# Patient Record
Sex: FEMALE | Race: WHITE | NOT HISPANIC OR LATINO | Employment: FULL TIME | URBAN - METROPOLITAN AREA
[De-identification: names, ages, dates, MRNs, and addresses within clinical notes are randomized per-mention and may not be internally consistent; named-entity substitution may affect disease eponyms.]

---

## 2020-01-01 ENCOUNTER — OFFICE VISIT (OUTPATIENT)
Dept: URGENT CARE | Facility: CLINIC | Age: 65
End: 2020-01-01
Payer: COMMERCIAL

## 2020-01-01 VITALS
WEIGHT: 111 LBS | HEIGHT: 65 IN | OXYGEN SATURATION: 99 % | RESPIRATION RATE: 19 BRPM | TEMPERATURE: 100 F | DIASTOLIC BLOOD PRESSURE: 97 MMHG | HEART RATE: 90 BPM | BODY MASS INDEX: 18.49 KG/M2 | SYSTOLIC BLOOD PRESSURE: 152 MMHG

## 2020-01-01 DIAGNOSIS — M77.9 TENDONITIS: ICD-10-CM

## 2020-01-01 DIAGNOSIS — M25.511 ACUTE PAIN OF RIGHT SHOULDER: Primary | ICD-10-CM

## 2020-01-01 PROCEDURE — 96372 PR INJECTION,THERAP/PROPH/DIAG2ST, IM OR SUBCUT: ICD-10-PCS | Mod: S$GLB,,, | Performed by: EMERGENCY MEDICINE

## 2020-01-01 PROCEDURE — 73030 X-RAY EXAM OF SHOULDER: CPT | Mod: FY,RT,S$GLB, | Performed by: RADIOLOGY

## 2020-01-01 PROCEDURE — 99203 PR OFFICE/OUTPT VISIT, NEW, LEVL III, 30-44 MIN: ICD-10-PCS | Mod: 25,S$GLB,, | Performed by: FAMILY MEDICINE

## 2020-01-01 PROCEDURE — 73030 XR SHOULDER COMPLETE 2 OR MORE VIEWS RIGHT: ICD-10-PCS | Mod: FY,RT,S$GLB, | Performed by: RADIOLOGY

## 2020-01-01 PROCEDURE — 99203 OFFICE O/P NEW LOW 30 MIN: CPT | Mod: 25,S$GLB,, | Performed by: FAMILY MEDICINE

## 2020-01-01 PROCEDURE — 96372 THER/PROPH/DIAG INJ SC/IM: CPT | Mod: S$GLB,,, | Performed by: EMERGENCY MEDICINE

## 2020-01-01 RX ORDER — METHYLPREDNISOLONE 4 MG/1
TABLET ORAL
Qty: 1 PACKAGE | Refills: 0 | Status: SHIPPED | OUTPATIENT
Start: 2020-01-01

## 2020-01-01 RX ORDER — AMLODIPINE BESYLATE 5 MG/1
TABLET ORAL
COMMUNITY
Start: 2019-12-17

## 2020-01-01 RX ORDER — KETOROLAC TROMETHAMINE 30 MG/ML
30 INJECTION, SOLUTION INTRAMUSCULAR; INTRAVENOUS
Status: COMPLETED | OUTPATIENT
Start: 2020-01-01 | End: 2020-01-01

## 2020-01-01 RX ORDER — ALPRAZOLAM 0.25 MG/1
TABLET ORAL
COMMUNITY
Start: 2019-12-17

## 2020-01-01 RX ADMIN — KETOROLAC TROMETHAMINE 30 MG: 30 INJECTION, SOLUTION INTRAMUSCULAR; INTRAVENOUS at 03:01

## 2020-01-01 NOTE — PATIENT INSTRUCTIONS
PLEASE READ YOUR DISCHARGE INSTRUCTIONS ENTIRELY AS IT CONTAINS IMPORTANT INFORMATION.    Tylenol and ibuprofen for pain  DO NOT TAKE ANY MORE NAPROXEN OR IBUPROFEN FOR 24 HOURS AS YOU RECEIVED A LARGE DOSE OF IT VIA INJECTION    If you take the medrol pack , take to completion and monitor blood pressure    Rest, ice, compress, and elevate at home  Sling as needed    Avoid prolonged use of the affected area until better.     Please see orthopedics if you develop new or worsening symptoms.     Please arrange follow up with your primary medical clinic as soon as possible. You must understand that you've received an Urgent Care treatment only and that you may be released before all of your medical problems are known or treated. You, the patient, will arrange for follow up as instructed. If your symptoms worsen or fail to improve you should go to the Emergency Room.    Arthralgia    Arthralgia is the term for pain in or around the joint. It is a symptom, not a disease. This pain may involve one or more joints. In some cases, the pain moves from joint to joint.  There are many causes for joint pain. These include:  · Injury  · Osteoarthritis (wearing out of the joint surface)  · Gout (inflammation of the joint due to crystals in the joint fluid)  · Infection inside the joint    · Bursitis (inflammation of the fluid-filled sacs around the joint)  · Autoimmune disorders such as rheumatoid arthritis or lupus  · Tendonitis (inflammation of chords that attach muscle to bone)  Home care  · Rest the involved joint(s) until your symptoms improve.   · You may be prescribed pain medicine. If none is prescribed, you may use acetaminophen or ibuprofen to control pain and inflammation.  Follow-up care  Follow up with your healthcare provider or as advised.  When to seek medical advice  Contact your healthcare provider right away if any of the following occurs:  · Pain, swelling, or redness of joint increases  · Pain worsens or recurs  after a period of improvement  · Pain moves to other joints  · You cannot bear weight on the affected joint   · You cannot move the affected joint  · Joint appears deformed  · New rash appears  · Fever of 100.4ºF (38ºC) or higher, or as directed by your healthcare provider  Date Last Reviewed: 3/1/2017  © 7221-1460 [employer]. [address] [place] [address], [place] [address]. All rights reserved. This information is not intended as a substitute for professional medical care. Always follow your healthcare professional's instructions.

## 2020-01-01 NOTE — PROGRESS NOTES
"Subjective:       Patient ID: Pepper Campos is a 64 y.o. female.    Vitals:  height is 5' 4.5" (1.638 m) and weight is 50.3 kg (111 lb). Her oral temperature is 99.5 °F (37.5 °C). Her blood pressure is 152/97 (abnormal) and her pulse is 90. Her respiration is 19 and oxygen saturation is 99%.     Chief Complaint: Shoulder Pain (right)    Right shoulder pain starting 4 days ago.  No injury.  States arm is more painful to move feels a little swollen.  No rash.  She has been take Tylenol and ibuprofen.  No injury to the shoulder in the past.  She did not lift anything heavy she can't specifically remember but it has been around her grandchildren lifting them recently.  Hurts to sleep on the right side    Shoulder Pain    The pain is present in the right shoulder. This is a new problem. The current episode started in the past 7 days (sunday). There has been no history of extremity trauma. The problem occurs constantly. The problem has been gradually worsening. The quality of the pain is described as aching. The pain is at a severity of 10/10. The pain is severe. Pertinent negatives include no fever, headaches, inability to bear weight, itching, joint locking, joint swelling, limited range of motion, numbness, stiffness, tingling or visual symptoms. The symptoms are aggravated by activity. She has tried acetaminophen and NSAIDS for the symptoms. The treatment provided mild relief. Family history does not include arthritis. There is no history of diabetes, Injuries to Extremity or migraines.       Constitution: Negative for chills, fatigue and fever.   HENT: Negative for congestion and sore throat.    Neck: Negative for painful lymph nodes.   Cardiovascular: Negative for chest pain and leg swelling.   Eyes: Negative for double vision and blurred vision.   Respiratory: Negative for cough and shortness of breath.    Gastrointestinal: Negative for nausea, vomiting and diarrhea.   Genitourinary: Negative for dysuria, frequency, " urgency and history of kidney stones.   Musculoskeletal: Positive for joint pain. Negative for joint swelling, abnormal ROM of joint, muscle cramps and muscle ache.   Skin: Negative for color change, pale, rash and bruising.   Allergic/Immunologic: Negative for seasonal allergies.   Neurological: Negative for dizziness, history of vertigo, light-headedness, passing out, headaches and numbness.   Hematologic/Lymphatic: Negative for swollen lymph nodes.   Psychiatric/Behavioral: Negative for nervous/anxious, sleep disturbance and depression. The patient is not nervous/anxious.        Objective:      Physical Exam   Constitutional: She is oriented to person, place, and time. Vital signs are normal. She appears well-developed and well-nourished. She is active and cooperative. No distress.   HENT:   Head: Normocephalic and atraumatic.   Nose: Nose normal.   Mouth/Throat: Oropharynx is clear and moist and mucous membranes are normal.   Eyes: Conjunctivae and lids are normal.   Neck: Trachea normal, normal range of motion, full passive range of motion without pain and phonation normal. Neck supple.   Cardiovascular: Normal rate, regular rhythm, normal heart sounds, intact distal pulses and normal pulses.   Pulmonary/Chest: Effort normal and breath sounds normal.   Abdominal: Soft. Normal appearance and bowel sounds are normal. She exhibits no abdominal bruit, no pulsatile midline mass and no mass.   Musculoskeletal: She exhibits no edema or deformity.        Right shoulder: She exhibits decreased range of motion (Abduction to only about 45 degrees passive and active), tenderness and bony tenderness. She exhibits no swelling, no effusion, no crepitus, no deformity and no laceration.   No rash.   Cap refill 2 seconds, radial pulse 2+, sensation intact     Neurological: She is alert and oriented to person, place, and time. She has normal strength and normal reflexes. No sensory deficit.   Skin: Skin is warm, dry, intact and  not diaphoretic.   Psychiatric: She has a normal mood and affect. Her speech is normal and behavior is normal. Judgment and thought content normal. Cognition and memory are normal.   Nursing note and vitals reviewed.      Xr Shoulder Complete 2 Or More Views Right    Result Date: 1/1/2020  EXAMINATION: XR SHOULDER COMPLETE 2 OR MORE VIEWS RIGHT CLINICAL HISTORY: Pain in right shoulder TECHNIQUE: Two or three views of the right shoulder were performed. COMPARISON: None FINDINGS: Two views right shoulder. The glenohumeral joint is intact.  The acromioclavicular joint is intact.  Amorphous calcification along the superolateral aspect of the humeral head suggests that of calcific tendinosis.     1. 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 Electronically signed by: Kilo Gagnon MD Date:    01/01/2020 Time:    15:36    Assessment:       1. Acute pain of right shoulder    2. Tendonitis        Plan:         Acute pain of right shoulder  -     ketorolac injection 30 mg  -     XR SHOULDER COMPLETE 2 OR MORE VIEWS RIGHT; Future; Expected date: 01/01/2020  -     SLING FOR HOME USE  -     methylPREDNISolone (MEDROL DOSEPACK) 4 mg tablet; use as directed  Dispense: 1 Package; Refill: 0    Tendonitis  Comments:  calcified    medrol pack printed, take if pain not improving monitor bp  nsaids at home she has ortho she can f/u with, sling prn    Patient Instructions   PLEASE READ YOUR DISCHARGE INSTRUCTIONS ENTIRELY AS IT CONTAINS IMPORTANT INFORMATION.    Tylenol and ibuprofen for pain  DO NOT TAKE ANY MORE NAPROXEN OR IBUPROFEN FOR 24 HOURS AS YOU RECEIVED A LARGE DOSE OF IT VIA INJECTION    If you take the medrol pack , take to completion and monitor blood pressure    Rest, ice, compress, and elevate at home  Sling as needed    Avoid prolonged use of the affected area until better.     Please see orthopedics if you develop new or worsening symptoms.     Please  arrange follow up with your primary medical clinic as soon as possible. You must understand that you've received an Urgent Care treatment only and that you may be released before all of your medical problems are known or treated. You, the patient, will arrange for follow up as instructed. If your symptoms worsen or fail to improve you should go to the Emergency Room.    Arthralgia    Arthralgia is the term for pain in or around the joint. It is a symptom, not a disease. This pain may involve one or more joints. In some cases, the pain moves from joint to joint.  There are many causes for joint pain. These include:  · Injury  · Osteoarthritis (wearing out of the joint surface)  · Gout (inflammation of the joint due to crystals in the joint fluid)  · Infection inside the joint    · Bursitis (inflammation of the fluid-filled sacs around the joint)  · Autoimmune disorders such as rheumatoid arthritis or lupus  · Tendonitis (inflammation of chords that attach muscle to bone)  Home care  · Rest the involved joint(s) until your symptoms improve.   · You may be prescribed pain medicine. If none is prescribed, you may use acetaminophen or ibuprofen to control pain and inflammation.  Follow-up care  Follow up with your healthcare provider or as advised.  When to seek medical advice  Contact your healthcare provider right away if any of the following occurs:  · Pain, swelling, or redness of joint increases  · Pain worsens or recurs after a period of improvement  · Pain moves to other joints  · You cannot bear weight on the affected joint   · You cannot move the affected joint  · Joint appears deformed  · New rash appears  · Fever of 100.4ºF (38ºC) or higher, or as directed by your healthcare provider  Date Last Reviewed: 3/1/2017  © 8897-8044 [employer]. [address] [place] [address]. All rights reserved. This information is not intended as a substitute for professional medical care. Always follow  your healthcare professional's instructions.

## 2020-02-05 ENCOUNTER — OFFICE VISIT (OUTPATIENT)
Dept: URGENT CARE | Facility: CLINIC | Age: 65
End: 2020-02-05
Payer: COMMERCIAL

## 2020-02-05 VITALS
WEIGHT: 111 LBS | DIASTOLIC BLOOD PRESSURE: 78 MMHG | TEMPERATURE: 99 F | HEART RATE: 103 BPM | RESPIRATION RATE: 16 BRPM | BODY MASS INDEX: 18.49 KG/M2 | SYSTOLIC BLOOD PRESSURE: 125 MMHG | HEIGHT: 65 IN | OXYGEN SATURATION: 96 %

## 2020-02-05 DIAGNOSIS — J10.1 INFLUENZA A: Primary | ICD-10-CM

## 2020-02-05 LAB
CTP QC/QA: YES
FLUAV AG NPH QL: POSITIVE
FLUBV AG NPH QL: NEGATIVE

## 2020-02-05 PROCEDURE — 99213 PR OFFICE/OUTPT VISIT, EST, LEVL III, 20-29 MIN: ICD-10-PCS | Mod: 25,S$GLB,, | Performed by: NURSE PRACTITIONER

## 2020-02-05 PROCEDURE — 87804 INFLUENZA ASSAY W/OPTIC: CPT | Mod: QW,S$GLB,, | Performed by: NURSE PRACTITIONER

## 2020-02-05 PROCEDURE — 99213 OFFICE O/P EST LOW 20 MIN: CPT | Mod: 25,S$GLB,, | Performed by: NURSE PRACTITIONER

## 2020-02-05 PROCEDURE — 87804 POCT INFLUENZA A/B: ICD-10-PCS | Mod: 59,QW,S$GLB, | Performed by: NURSE PRACTITIONER

## 2020-02-05 RX ORDER — FLUTICASONE PROPIONATE 50 MCG
2 SPRAY, SUSPENSION (ML) NASAL DAILY
Qty: 16 ML | Refills: 0 | Status: SHIPPED | OUTPATIENT
Start: 2020-02-05

## 2020-02-05 RX ORDER — LEVOCETIRIZINE DIHYDROCHLORIDE 5 MG/1
5 TABLET, FILM COATED ORAL DAILY
Qty: 30 TABLET | Refills: 0 | Status: SHIPPED | OUTPATIENT
Start: 2020-02-05 | End: 2021-02-04

## 2020-02-05 RX ORDER — GUAIFENESIN 600 MG/1
600 TABLET, EXTENDED RELEASE ORAL 2 TIMES DAILY
Qty: 60 TABLET | Refills: 0 | Status: SHIPPED | OUTPATIENT
Start: 2020-02-05 | End: 2021-02-04

## 2020-02-05 RX ORDER — OSELTAMIVIR PHOSPHATE 75 MG/1
75 CAPSULE ORAL 2 TIMES DAILY
Qty: 10 CAPSULE | Refills: 0 | Status: SHIPPED | OUTPATIENT
Start: 2020-02-05 | End: 2020-02-10

## 2020-02-05 RX ORDER — PROMETHAZINE HYDROCHLORIDE AND DEXTROMETHORPHAN HYDROBROMIDE 6.25; 15 MG/5ML; MG/5ML
5 SYRUP ORAL
Qty: 180 ML | Refills: 0 | Status: SHIPPED | OUTPATIENT
Start: 2020-02-05 | End: 2020-02-10

## 2020-02-05 NOTE — PATIENT INSTRUCTIONS
Please follow up with your Primary care provider within 2-5 days if your signs and symptoms have not resolved or worsen.     If your condition worsens or fails to improve we recommend that you receive another evaluation at the emergency room immediately or contact your primary medical clinic to discuss your concerns.   You must understand that you have received an Urgent Care treatment only and that you may be released before all of your medical problems are known or treated. You, the patient, will arrange for follow up care as instructed.     RED FLAGS/WARNING SYMPTOMS DISCUSSED WITH PATIENT THAT WOULD WARRANT EMERGENT MEDICAL ATTENTION. PATIENT VERBALIZED UNDERSTANDING.       Influenza (Adult)    Influenza is also called the flu. It is a viral illness that affects the air passages of your lungs. It is different from the common cold. The flu can easily be passed from one to person to another. It may be spread through the air by coughing and sneezing. Or it can be spread by touching the sick person and then touching your own eyes, nose, or mouth.  The flu starts 1 to 3 days after you are exposed to the flu virus. It may last for 1 to 2 weeks but many people feel tired or fatigued for many weeks afterward. You usually dont need to take antibiotics unless you have a complication. This might be an ear or sinus infection or pneumonia.  Symptoms of the flu may be mild or severe. They can include extreme tiredness (wanting to stay in bed all day), chills, fevers, muscle aches, soreness with eye movement, headache, and a dry, hacking cough.  Home care  Follow these guidelines when caring for yourself at home:  · Avoid being around cigarette smoke, whether yours or other peoples.  · Acetaminophen or ibuprofen will help ease your fever, muscle aches, and headache. Dont give aspirin to anyone younger than 18 who has the flu. Aspirin can harm the liver.  · Nausea and loss of appetite are common with the flu. Eat light  meals. Drink 6 to 8 glasses of liquids every day. Good choices are water, sport drinks, soft drinks without caffeine, juices, tea, and soup. Extra fluids will also help loosen secretions in your nose and lungs.  · Over-the-counter cold medicines will not make the flu go away faster. But the medicines may help with coughing, sore throat, and congestion in your nose and sinuses. Dont use a decongestant if you have high blood pressure.  · Stay home until your fever has been gone for at least 24 hours without using medicine to reduce fever.  Follow-up care  Follow up with your healthcare provider, or as advised, if you are not getting better over the next week.  If you are age 65 or older, talk with your provider about getting a pneumococcal vaccine every 5 years. You should also get this vaccine if you have chronic asthma or COPD. All adults should get a flu vaccine every fall. Ask your provider about this.  When to seek medical advice  Call your healthcare provider right away if any of these occur:  · Cough with lots of colored mucus (sputum) or blood in your mucus  · Chest pain, shortness of breath, wheezing, or trouble breathing  · Severe headache, or face, neck, or ear pain  · New rash with fever  · Fever of 100.4°F (38°C) or higher, or as directed by your healthcare provider  · Confusion, behavior change, or seizure  · Severe weakness or dizziness  · You get a new fever or cough after getting better for a few days  Date Last Reviewed: 1/1/2017  © 9909-2486 [employer]. [address] [place] [address], [place] [address]. All rights reserved. This information is not intended as a substitute for professional medical care. Always follow your healthcare professional's instructions.

## 2020-02-05 NOTE — PROGRESS NOTES
"Subjective:       Patient ID: Pepper Campos is a 64 y.o. female.    Vitals:  height is 5' 4.5" (1.638 m) and weight is 50.3 kg (111 lb). Her temperature is 99.4 °F (37.4 °C). Her blood pressure is 125/78 and her pulse is 103. Her respiration is 16 and oxygen saturation is 96%.     Chief Complaint: Cough    Pt states cough with chills, body aches, and headache x 3 days. Pt states temp of 101 and nausea this am. Pt took tylenol for fever. Pt just got back from [place].    Cough   This is a new problem. The current episode started in the past 7 days. The problem has been gradually worsening. The cough is non-productive. Associated symptoms include chills, headaches and myalgias. Pertinent negatives include no chest pain, fever, rash, sore throat or shortness of breath. Nothing aggravates the symptoms. Treatments tried: tylenol. The treatment provided moderate relief. Her past medical history is significant for bronchitis.       Constitution: Positive for chills. Negative for fatigue and fever.   HENT: Positive for congestion. Negative for sore throat.    Neck: Negative for painful lymph nodes.   Cardiovascular: Negative for chest pain and leg swelling.   Eyes: Negative for double vision and blurred vision.   Respiratory: Positive for cough. Negative for shortness of breath.    Gastrointestinal: Positive for nausea. Negative for vomiting and diarrhea.   Genitourinary: Negative for dysuria, frequency, urgency and history of kidney stones.   Musculoskeletal: Positive for muscle ache. Negative for joint pain, joint swelling and muscle cramps.   Skin: Negative for color change, pale, rash and bruising.   Allergic/Immunologic: Negative for seasonal allergies.   Neurological: Positive for headaches. Negative for dizziness, history of vertigo, light-headedness and passing out.   Hematologic/Lymphatic: Negative for swollen lymph nodes.   Psychiatric/Behavioral: Negative for nervous/anxious, sleep disturbance and depression. " The patient is not nervous/anxious.        Objective:      Physical Exam   Constitutional: She is oriented to person, place, and time. She appears well-developed and well-nourished. She is cooperative.  Non-toxic appearance. She does not appear ill. No distress.   HENT:   Head: Normocephalic and atraumatic.   Right Ear: Hearing, tympanic membrane, external ear and ear canal normal.   Left Ear: Hearing, tympanic membrane, external ear and ear canal normal.   Nose: Mucosal edema and rhinorrhea present. No nasal deformity. No epistaxis. Right sinus exhibits no maxillary sinus tenderness and no frontal sinus tenderness. Left sinus exhibits no maxillary sinus tenderness and no frontal sinus tenderness.   Mouth/Throat: Uvula is midline and mucous membranes are normal. No trismus in the jaw. Normal dentition. No uvula swelling. Posterior oropharyngeal erythema present. No oropharyngeal exudate or posterior oropharyngeal edema.   Eyes: Conjunctivae and lids are normal. Right eye exhibits no discharge. Left eye exhibits no discharge. No scleral icterus.   Neck: Trachea normal, normal range of motion, full passive range of motion without pain and phonation normal. Neck supple.   Cardiovascular: Normal rate, regular rhythm, normal heart sounds, intact distal pulses and normal pulses.   Pulmonary/Chest: Effort normal and breath sounds normal. No respiratory distress.   Abdominal: Soft. Normal appearance and bowel sounds are normal. She exhibits no distension, no pulsatile midline mass and no mass. There is no tenderness.   Musculoskeletal: Normal range of motion. She exhibits no edema or deformity.   Neurological: She is alert and oriented to person, place, and time. She exhibits normal muscle tone. Coordination normal.   Skin: Skin is warm, dry, intact, not diaphoretic and not pale.   Psychiatric: She has a normal mood and affect. Her speech is normal and behavior is normal. Judgment and thought content normal. Cognition and  memory are normal.   Nursing note and vitals reviewed.        Assessment:       1. Influenza A        Plan:         Influenza A  -     POCT Influenza A/B  -     levocetirizine (XYZAL) 5 MG tablet; Take 1 tablet (5 mg total) by mouth once daily.  Dispense: 30 tablet; Refill: 0  -     guaiFENesin (MUCINEX) 600 mg 12 hr tablet; Take 1 tablet (600 mg total) by mouth 2 (two) times daily.  Dispense: 60 tablet; Refill: 0  -     promethazine-dextromethorphan (PROMETHAZINE-DM) 6.25-15 mg/5 mL Syrp; Take 5 mLs by mouth every 4 to 6 hours as needed.  Dispense: 180 mL; Refill: 0  -     fluticasone propionate (FLONASE) 50 mcg/actuation nasal spray; 2 sprays (100 mcg total) by Each Nostril route once daily.  Dispense: 16 mL; Refill: 0  -     oseltamivir (TAMIFLU) 75 MG capsule; Take 1 capsule (75 mg total) by mouth 2 (two) times daily. for 5 days  Dispense: 10 capsule; Refill: 0          Results for orders placed or performed in visit on 02/05/20   POCT Influenza A/B   Result Value Ref Range    Rapid Influenza A Ag Positive (A) Negative    Rapid Influenza B Ag Negative Negative     Acceptable Yes      Please follow up with your Primary care provider within 2-5 days if your signs and symptoms have not resolved or worsen.     If your condition worsens or fails to improve we recommend that you receive another evaluation at the emergency room immediately or contact your primary medical clinic to discuss your concerns.   You must understand that you have received an Urgent Care treatment only and that you may be released before all of your medical problems are known or treated. You, the patient, will arrange for follow up care as instructed.     RED FLAGS/WARNING SYMPTOMS DISCUSSED WITH PATIENT THAT WOULD WARRANT EMERGENT MEDICAL ATTENTION. PATIENT VERBALIZED UNDERSTANDING.       Influenza (Adult)    Influenza is also called the flu. It is a viral illness that affects the air passages of your lungs. It is different from  the common cold. The flu can easily be passed from one to person to another. It may be spread through the air by coughing and sneezing. Or it can be spread by touching the sick person and then touching your own eyes, nose, or mouth.  The flu starts 1 to 3 days after you are exposed to the flu virus. It may last for 1 to 2 weeks but many people feel tired or fatigued for many weeks afterward. You usually dont need to take antibiotics unless you have a complication. This might be an ear or sinus infection or pneumonia.  Symptoms of the flu may be mild or severe. They can include extreme tiredness (wanting to stay in bed all day), chills, fevers, muscle aches, soreness with eye movement, headache, and a dry, hacking cough.  Home care  Follow these guidelines when caring for yourself at home:  · Avoid being around cigarette smoke, whether yours or other peoples.  · Acetaminophen or ibuprofen will help ease your fever, muscle aches, and headache. Dont give aspirin to anyone younger than 18 who has the flu. Aspirin can harm the liver.  · Nausea and loss of appetite are common with the flu. Eat light meals. Drink 6 to 8 glasses of liquids every day. Good choices are water, sport drinks, soft drinks without caffeine, juices, tea, and soup. Extra fluids will also help loosen secretions in your nose and lungs.  · Over-the-counter cold medicines will not make the flu go away faster. But the medicines may help with coughing, sore throat, and congestion in your nose and sinuses. Dont use a decongestant if you have high blood pressure.  · Stay home until your fever has been gone for at least 24 hours without using medicine to reduce fever.  Follow-up care  Follow up with your healthcare provider, or as advised, if you are not getting better over the next week.  If you are age 65 or older, talk with your provider about getting a pneumococcal vaccine every 5 years. You should also get this vaccine if you have chronic asthma or  COPD. All adults should get a flu vaccine every fall. Ask your provider about this.  When to seek medical advice  Call your healthcare provider right away if any of these occur:  · Cough with lots of colored mucus (sputum) or blood in your mucus  · Chest pain, shortness of breath, wheezing, or trouble breathing  · Severe headache, or face, neck, or ear pain  · New rash with fever  · Fever of 100.4°F (38°C) or higher, or as directed by your healthcare provider  · Confusion, behavior change, or seizure  · Severe weakness or dizziness  · You get a new fever or cough after getting better for a few days  Date Last Reviewed: 1/1/2017  © 1364-3082 [employer]. [address] [place] [id number], [place] [address]. All rights reserved. This information is not intended as a substitute for professional medical care. Always follow your healthcare professional's instructions.

## 2025-03-18 ENCOUNTER — OFFICE VISIT (OUTPATIENT)
Dept: URGENT CARE | Facility: CLINIC | Age: 70
End: 2025-03-18
Payer: MEDICARE

## 2025-03-18 ENCOUNTER — E-CONSULT (OUTPATIENT)
Facility: CLINIC | Age: 70
End: 2025-03-18
Payer: MEDICARE

## 2025-03-18 VITALS
HEART RATE: 88 BPM | WEIGHT: 111 LBS | SYSTOLIC BLOOD PRESSURE: 201 MMHG | TEMPERATURE: 98 F | BODY MASS INDEX: 18.95 KG/M2 | DIASTOLIC BLOOD PRESSURE: 116 MMHG | OXYGEN SATURATION: 98 % | RESPIRATION RATE: 19 BRPM | HEIGHT: 64 IN
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
## 2025-03-18 DIAGNOSIS — I10 PRIMARY HYPERTENSION: ICD-10-CM

## 2025-03-18 DIAGNOSIS — S01.112A LACERATION OF EYEBROW AND FOREHEAD, LEFT, INITIAL ENCOUNTER: ICD-10-CM

## 2025-03-18 DIAGNOSIS — I10 HYPERTENSION, UNSPECIFIED TYPE: ICD-10-CM

## 2025-03-18 DIAGNOSIS — S01.81XA LACERATION OF EYEBROW AND FOREHEAD, LEFT, INITIAL ENCOUNTER: ICD-10-CM

## 2025-03-18 DIAGNOSIS — S09.90XA CLOSED HEAD INJURY, INITIAL ENCOUNTER: Primary | ICD-10-CM

## 2025-03-18 DIAGNOSIS — S09.90XA INJURY OF HEAD, INITIAL ENCOUNTER: ICD-10-CM

## 2025-03-18 DIAGNOSIS — T14.8XXA SKIN ABRASION: ICD-10-CM

## 2025-03-18 DIAGNOSIS — S49.92XA INJURY OF LEFT SHOULDER, INITIAL ENCOUNTER: Primary | ICD-10-CM

## 2025-03-18 PROCEDURE — 12011 RPR F/E/E/N/L/M 2.5 CM/<: CPT | Mod: S$GLB,,, | Performed by: FAMILY MEDICINE

## 2025-03-18 PROCEDURE — 99451 NTRPROF PH1/NTRNET/EHR 5/>: CPT | Mod: S$GLB,,, | Performed by: EMERGENCY MEDICINE

## 2025-03-18 PROCEDURE — 90471 IMMUNIZATION ADMIN: CPT | Mod: S$GLB,,, | Performed by: FAMILY MEDICINE

## 2025-03-18 PROCEDURE — 90715 TDAP VACCINE 7 YRS/> IM: CPT | Mod: S$GLB,,, | Performed by: FAMILY MEDICINE

## 2025-03-18 PROCEDURE — 73030 X-RAY EXAM OF SHOULDER: CPT | Mod: LT,S$GLB,, | Performed by: RADIOLOGY

## 2025-03-18 PROCEDURE — 99214 OFFICE O/P EST MOD 30 MIN: CPT | Mod: 25,S$GLB,, | Performed by: FAMILY MEDICINE

## 2025-03-18 RX ORDER — LISINOPRIL 5 MG/1
5 TABLET ORAL
COMMUNITY

## 2025-03-18 NOTE — PROGRESS NOTES
"Subjective:      Patient ID: Pepper Campos is a 69 y.o. female.    Vitals:  height is 5' 4.49" (1.638 m) and weight is 50.3 kg (111 lb). Her oral temperature is 97.6 °F (36.4 °C). Her blood pressure is 201/116 (abnormal) and her pulse is 88. Her respiration is 19 and oxygen saturation is 98%.     Chief Complaint: Fall (Presenting 30 mins after sustaining a fall)    69 y.o. female presenting 30 mins after sustaining a fall. Patient reports that she fell on the left side sustaining a head strike and falling on her left shoulder and left knee. Patient presents with a past medical history of hypertension. She states that while walking her dog, she tripped over an uneven side walk and fell. Patient denies any LOC, dizziness, vision changes, neck pain, headache, sensroy changes or motor weakness, nausea, vomiting, chest pain, palpitations, or SOB. Hemostasis present on arrival. She is not UTD with tetanus (>10 days). She has not taken any OTC analgesia.    Fall  The accident occurred Less than 1 hour ago. Distance fallen: Standing height. She landed on [place]. The volume of blood lost was minimal. The point of impact was the head, left shoulder and left knee. The pain is present in the head, left shoulder and left knee. The pain is at a severity of 8/10. The pain is moderate. Pertinent negatives include no abdominal pain, bowel incontinence, fever, headaches, hearing loss, hematuria, loss of consciousness, nausea, numbness, tingling, visual change or vomiting.       Constitution: Negative for fever.   Gastrointestinal:  Negative for abdominal pain, nausea, vomiting and bowel incontinence.   Genitourinary:  Negative for hematuria.   Neurological:  Negative for headaches, loss of consciousness and numbness.      Objective:     Physical Exam   Constitutional: She is oriented to person, place, and time. She appears well-developed. She is cooperative.  Non-toxic appearance. She does not appear ill. No distress.   HENT: "   Head: Normocephalic. Head is without abrasion, without contusion and without laceration.      Comments:   Positive about 1 cm superficial laceration.  No active bleeding at present.  No black eye.    PERRLA, EOMI  Ears:   Right Ear: Hearing, tympanic membrane, external ear and ear canal normal. No hemotympanum. no impacted cerumen  Left Ear: Hearing, tympanic membrane, external ear and ear canal normal. No hemotympanum. no impacted cerumen  Nose: Nose normal. No mucosal edema, rhinorrhea, nasal deformity or congestion. No epistaxis. Right sinus exhibits no maxillary sinus tenderness and no frontal sinus tenderness. Left sinus exhibits no maxillary sinus tenderness and no frontal sinus tenderness.   Mouth/Throat: Uvula is midline, oropharynx is clear and moist and mucous membranes are normal. No trismus in the jaw. Normal dentition. No uvula swelling. No oropharyngeal exudate or posterior oropharyngeal erythema.   Eyes: Conjunctivae, EOM and lids are normal. Pupils are equal, round, and reactive to light. Right eye exhibits no discharge. Left eye exhibits no discharge. No scleral icterus. Extraocular movement intact   Neck: Trachea normal and phonation normal. Neck supple. No tracheal deviation present. No neck rigidity present. No spinous process tenderness present. No muscular tenderness present.   Cardiovascular: Normal rate, regular rhythm, normal heart sounds and normal pulses.   Pulmonary/Chest: Effort normal and breath sounds normal. No respiratory distress.   Abdominal: Normal appearance and bowel sounds are normal. She exhibits no distension and no mass. Soft. There is no abdominal tenderness.   Musculoskeletal: Normal range of motion.         General: No deformity. Normal range of motion.   Neurological: She is alert, oriented to person, place, and time and at baseline. She has normal strength. She displays no weakness and normal reflexes. No cranial nerve deficit or sensory deficit. She exhibits normal  muscle tone. She displays no seizure activity. Coordination and gait normal. GCS eye subscore is 4. GCS verbal subscore is 5. GCS motor subscore is 6.   Skin: Skin is warm, dry, intact and not diaphoretic. Capillary refill takes less than 2 seconds. No abrasion, No burn and No ecchymosis         Comments:   Positive very superficial abrasions over left shoulder and left knee.  Full ROM at shoulder and knee.    No bruise or swelling.  Neurovascular intact.   Psychiatric: Her speech is normal and behavior is normal. Mood, judgment and thought content normal.   Nursing note and vitals reviewed.      Assessment:     1. Injury of left shoulder, initial encounter    2. Fall, initial encounter    3. Injury of head, initial encounter    4. Primary hypertension    5. Skin abrasion    6. Laceration of eyebrow and forehead, left, initial encounter        Plan:   Discussed exam findings/diagnosis/plan with patient.  Hypertension and head injury precautions advised.  Advised to f/u with PCP within 2-5 days. ER precautions given if symptoms get any worse. All questions answered. Patient verbally understood and agreed with treatment plan.  Educational materials and instructions regarding the visit diagnosis and management provided.     Injury of left shoulder, initial encounter  -     XR SHOULDER COMPLETE 2 OR MORE VIEWS LEFT; Future; Expected date: 03/18/2025    Fall, initial encounter  -     XR SHOULDER COMPLETE 2 OR MORE VIEWS LEFT; Future; Expected date: 03/18/2025  -     E-Consult to [facility]    Injury of head, initial encounter  -     E-Consult to [facility]    Primary hypertension    Skin abrasion  -     Tdap vaccine injection 0.5 mL    Laceration of eyebrow and forehead, left, initial encounter  -     Tdap vaccine injection 0.5 mL  -     Laceration Repair

## 2025-03-19 NOTE — CONSULTS
Virtual Visit - Urgent Care  Response for E-Consult     Patient Name: Pepper Campos  MRN: [id number]  Primary Care Provider: Tamara, Primary Doctor   Requesting Provider: Kemal Barton MD  E-Consult to [facility]  Consult performed by: Sánchez Díaz DO  Consult ordered by: Kemal Barton MD  Reason for consult: head injury with HTN          Recommendation: 69-year-old female seen by primary care physician.  Fell and struck it out about 90 minutes prior to arrival.  Small superficial cut to the left brow otherwise no other symptoms specifically no dizziness, blurred vision nausea vomiting or headache.  Elevated blood pressure of 207/120 however the patient has been noncompliant with her blood pressure medication.  Requesting advice if CT head is needed.  Patient not on blood thinners.  Per the [place] C-spine rules the only positive she does have is she is greater than age of 65.  However based on the primary care who is evaluated the patient the only reason he was considering CT head is because of her elevated blood pressure.  This is more likely secondary to noncompliance and do not feel, if this is the only reason he is thinking CT, that is indicated at this time.  Recommend he give precautions for head injury and when to return the emergency department.    Additional future steps to consider:     Total time of Consultation: 10 minute    I did not speak to the requesting provider verbally about this.     *This eConsult is based on the clinical data available to me and is furnished without benefit of a physical examination. The eConsult will need to be interpreted in light of any clinical issues or changes in patient status not available to me at the time of filing this eConsults. Significant changes in patient condition or level of acuity should result in immediate formal consultation and reevaluation. Please alert me if you have further questions.    Thank you for this eConsult  referral.     Sánchez Díaz, DO  Virtual Visit - Urgent Care

## 2025-03-19 NOTE — PROCEDURES
Laceration Repair    Date/Time: 3/18/2025 5:45 PM    Performed by: Kemal Barton MD  Authorized by: Kemal Barton MD  Body area: head/neck  Location details: forehead  Laceration length: 1 cm  Foreign bodies: no foreign bodies  Vascular damage: no  Skin closure: glue  Approximation: close  Patient tolerance: Patient tolerated the procedure well with no immediate complications